# Patient Record
Sex: MALE | Race: WHITE | NOT HISPANIC OR LATINO | Employment: FULL TIME | URBAN - METROPOLITAN AREA
[De-identification: names, ages, dates, MRNs, and addresses within clinical notes are randomized per-mention and may not be internally consistent; named-entity substitution may affect disease eponyms.]

---

## 2023-09-26 PROBLEM — I10 ACCELERATED ESSENTIAL HYPERTENSION: Status: ACTIVE | Noted: 2023-09-26

## 2023-09-26 PROBLEM — D48.5 NEOPLASM OF UNCERTAIN BEHAVIOR OF SKIN: Status: ACTIVE | Noted: 2023-09-26

## 2023-09-26 PROBLEM — L85.3 XEROSIS CUTIS: Status: ACTIVE | Noted: 2023-03-21

## 2023-09-26 PROBLEM — D22.60 MELANOCYTIC NEVI OF UNSPECIFIED UPPER LIMB, INCLUDING SHOULDER: Status: ACTIVE | Noted: 2023-03-21

## 2023-09-26 PROBLEM — L81.4 OTHER MELANIN HYPERPIGMENTATION: Status: ACTIVE | Noted: 2023-03-21

## 2023-09-26 PROBLEM — L28.0 LICHEN SIMPLEX CHRONICUS: Status: ACTIVE | Noted: 2023-03-21

## 2023-09-26 PROBLEM — L91.8 OTHER HYPERTROPHIC DISORDERS OF THE SKIN: Status: ACTIVE | Noted: 2023-03-21

## 2023-09-26 PROBLEM — D22.5 MELANOCYTIC NEVI OF TRUNK: Status: ACTIVE | Noted: 2023-03-21

## 2023-09-26 PROBLEM — D18.01 HEMANGIOMA OF SKIN AND SUBCUTANEOUS TISSUE: Status: ACTIVE | Noted: 2023-03-21

## 2023-09-26 PROBLEM — L82.1 OTHER SEBORRHEIC KERATOSIS: Status: ACTIVE | Noted: 2023-03-21

## 2023-09-26 PROBLEM — E78.2 MIXED HYPERLIPIDEMIA: Status: ACTIVE | Noted: 2023-09-26

## 2023-09-26 PROBLEM — L82.0 INFLAMED SEBORRHEIC KERATOSIS: Status: ACTIVE | Noted: 2023-03-21

## 2023-09-26 PROBLEM — L57.0 ACTINIC KERATOSIS: Status: ACTIVE | Noted: 2023-03-21

## 2023-09-26 PROBLEM — D22.70 MELANOCYTIC NEVI OF UNSPECIFIED LOWER LIMB, INCLUDING HIP: Status: ACTIVE | Noted: 2023-03-21

## 2023-09-26 RX ORDER — NAPROXEN SODIUM 220 MG/1
81 TABLET, FILM COATED ORAL DAILY
COMMUNITY
Start: 2019-08-13

## 2023-09-26 RX ORDER — FLUOROURACIL 50 MG/G
CREAM TOPICAL
COMMUNITY
Start: 2019-10-22 | End: 2023-09-27 | Stop reason: ALTCHOICE

## 2023-09-26 RX ORDER — AMMONIUM LACTATE 12 G/100G
CREAM TOPICAL
COMMUNITY
Start: 2019-10-22

## 2023-09-27 ENCOUNTER — OFFICE VISIT (OUTPATIENT)
Dept: PRIMARY CARE | Facility: CLINIC | Age: 78
End: 2023-09-27
Payer: MEDICARE

## 2023-09-27 VITALS — OXYGEN SATURATION: 94 % | SYSTOLIC BLOOD PRESSURE: 138 MMHG | HEART RATE: 56 BPM | DIASTOLIC BLOOD PRESSURE: 80 MMHG

## 2023-09-27 DIAGNOSIS — Z00.00 ROUTINE GENERAL MEDICAL EXAMINATION AT HEALTH CARE FACILITY: Primary | ICD-10-CM

## 2023-09-27 DIAGNOSIS — E78.2 MIXED HYPERLIPIDEMIA: ICD-10-CM

## 2023-09-27 DIAGNOSIS — I10 ESSENTIAL (PRIMARY) HYPERTENSION: ICD-10-CM

## 2023-09-27 DIAGNOSIS — Z12.5 PROSTATE CANCER SCREENING: ICD-10-CM

## 2023-09-27 PROCEDURE — G0439 PPPS, SUBSEQ VISIT: HCPCS | Performed by: FAMILY MEDICINE

## 2023-09-27 PROCEDURE — 99213 OFFICE O/P EST LOW 20 MIN: CPT | Performed by: FAMILY MEDICINE

## 2023-09-27 PROCEDURE — 1170F FXNL STATUS ASSESSED: CPT | Performed by: FAMILY MEDICINE

## 2023-09-27 PROCEDURE — 1160F RVW MEDS BY RX/DR IN RCRD: CPT | Performed by: FAMILY MEDICINE

## 2023-09-27 PROCEDURE — 1159F MED LIST DOCD IN RCRD: CPT | Performed by: FAMILY MEDICINE

## 2023-09-27 PROCEDURE — 1036F TOBACCO NON-USER: CPT | Performed by: FAMILY MEDICINE

## 2023-09-27 PROCEDURE — 3075F SYST BP GE 130 - 139MM HG: CPT | Performed by: FAMILY MEDICINE

## 2023-09-27 PROCEDURE — 3079F DIAST BP 80-89 MM HG: CPT | Performed by: FAMILY MEDICINE

## 2023-09-27 ASSESSMENT — PATIENT HEALTH QUESTIONNAIRE - PHQ9
SUM OF ALL RESPONSES TO PHQ9 QUESTIONS 1 AND 2: 0
1. LITTLE INTEREST OR PLEASURE IN DOING THINGS: NOT AT ALL
2. FEELING DOWN, DEPRESSED OR HOPELESS: NOT AT ALL

## 2023-09-27 ASSESSMENT — ACTIVITIES OF DAILY LIVING (ADL)
DOING_HOUSEWORK: INDEPENDENT
DRESSING: INDEPENDENT
BATHING: INDEPENDENT
GROCERY_SHOPPING: INDEPENDENT
TAKING_MEDICATION: INDEPENDENT
MANAGING_FINANCES: INDEPENDENT

## 2023-09-27 ASSESSMENT — ENCOUNTER SYMPTOMS
CONSTIPATION: 0
PALPITATIONS: 0
VOMITING: 0
UNEXPECTED WEIGHT CHANGE: 0

## 2023-09-27 NOTE — PROGRESS NOTES
Wondering if he should still have the shingles vaccine at his age  Declines the flu shot  Subjective   Patient ID: Lalit Bailey is a 78 y.o. male who presents for Medicare Annual Wellness Visit Subsequent.    HPI   No CP SOB edema  Trying to follow good diet and staying active      Review of Systems   Constitutional:  Negative for unexpected weight change.   HENT:  Negative for congestion and ear discharge.    Cardiovascular:  Negative for chest pain and palpitations.   Gastrointestinal:  Negative for constipation and vomiting.   All other systems reviewed and are negative.      Objective   /80   Pulse 56   SpO2 94%     Physical Exam  HENT:      Head: Normocephalic and atraumatic.      Nose: Nose normal.      Mouth/Throat:      Mouth: Mucous membranes are moist.      Pharynx: No oropharyngeal exudate.   Eyes:      Extraocular Movements: Extraocular movements intact.      Conjunctiva/sclera: Conjunctivae normal.      Pupils: Pupils are equal, round, and reactive to light.   Cardiovascular:      Rate and Rhythm: Normal rate and regular rhythm.   Pulmonary:      Effort: Pulmonary effort is normal.   Abdominal:      General: There is no distension.      Palpations: Abdomen is soft.   Musculoskeletal:      Cervical back: Normal range of motion and neck supple.   Lymphadenopathy:      Cervical: No cervical adenopathy.   Neurological:      General: No focal deficit present.      Mental Status: He is alert.   Psychiatric:         Attention and Perception: Attention normal.         Speech: Speech normal.         Behavior: Behavior is cooperative.         Assessment/Plan   Diagnoses and all orders for this visit:  Routine general medical examination at health care facility  Prostate cancer screening  -     Prostate Specific Antigen, Screen; Future  Mixed hyperlipidemia  Comments:  Low-fat low cholesterol diet and regular activity  Patient does not want to be on a statin  Orders:  -     Lipid Panel; Future  -      CBC and Auto Differential; Future  -     Comprehensive Metabolic Panel; Future  Essential (primary) hypertension  Comments:  Controlled with lifestyle modification  Orders:  -     CBC and Auto Differential; Future  -     Comprehensive Metabolic Panel; Future    Offered CT cardiac scoring and patient defers  FRANCISCO NOVA discussed  Recheck 1 year sooner if any issues arise

## 2023-09-29 ENCOUNTER — LAB (OUTPATIENT)
Dept: LAB | Facility: LAB | Age: 78
End: 2023-09-29
Payer: MEDICARE

## 2023-09-29 DIAGNOSIS — I10 ESSENTIAL (PRIMARY) HYPERTENSION: ICD-10-CM

## 2023-09-29 DIAGNOSIS — E78.2 MIXED HYPERLIPIDEMIA: ICD-10-CM

## 2023-09-29 DIAGNOSIS — Z12.5 PROSTATE CANCER SCREENING: ICD-10-CM

## 2023-09-29 LAB
ALANINE AMINOTRANSFERASE (SGPT) (U/L) IN SER/PLAS: 18 U/L (ref 10–52)
ALBUMIN (G/DL) IN SER/PLAS: 3.7 G/DL (ref 3.4–5)
ALKALINE PHOSPHATASE (U/L) IN SER/PLAS: 63 U/L (ref 33–136)
ANION GAP IN SER/PLAS: 10 MMOL/L (ref 10–20)
ASPARTATE AMINOTRANSFERASE (SGOT) (U/L) IN SER/PLAS: 16 U/L (ref 9–39)
BASOPHILS (10*3/UL) IN BLOOD BY AUTOMATED COUNT: 0.07 X10E9/L (ref 0–0.1)
BASOPHILS/100 LEUKOCYTES IN BLOOD BY AUTOMATED COUNT: 1.5 % (ref 0–2)
BILIRUBIN TOTAL (MG/DL) IN SER/PLAS: 0.5 MG/DL (ref 0–1.2)
CALCIUM (MG/DL) IN SER/PLAS: 8.3 MG/DL (ref 8.6–10.3)
CARBON DIOXIDE, TOTAL (MMOL/L) IN SER/PLAS: 29 MMOL/L (ref 21–32)
CHLORIDE (MMOL/L) IN SER/PLAS: 105 MMOL/L (ref 98–107)
CHOLESTEROL (MG/DL) IN SER/PLAS: 233 MG/DL (ref 0–199)
CHOLESTEROL IN HDL (MG/DL) IN SER/PLAS: 47.4 MG/DL
CHOLESTEROL/HDL RATIO: 4.9
CREATININE (MG/DL) IN SER/PLAS: 0.93 MG/DL (ref 0.5–1.3)
EOSINOPHILS (10*3/UL) IN BLOOD BY AUTOMATED COUNT: 0.43 X10E9/L (ref 0–0.4)
EOSINOPHILS/100 LEUKOCYTES IN BLOOD BY AUTOMATED COUNT: 9.5 % (ref 0–6)
ERYTHROCYTE DISTRIBUTION WIDTH (RATIO) BY AUTOMATED COUNT: 13 % (ref 11.5–14.5)
ERYTHROCYTE MEAN CORPUSCULAR HEMOGLOBIN CONCENTRATION (G/DL) BY AUTOMATED: 31.2 G/DL (ref 32–36)
ERYTHROCYTE MEAN CORPUSCULAR VOLUME (FL) BY AUTOMATED COUNT: 95 FL (ref 80–100)
ERYTHROCYTES (10*6/UL) IN BLOOD BY AUTOMATED COUNT: 4.75 X10E12/L (ref 4.5–5.9)
GFR MALE: 85 ML/MIN/1.73M2
GLUCOSE (MG/DL) IN SER/PLAS: 107 MG/DL (ref 74–99)
HEMATOCRIT (%) IN BLOOD BY AUTOMATED COUNT: 44.9 % (ref 41–52)
HEMOGLOBIN (G/DL) IN BLOOD: 14 G/DL (ref 13.5–17.5)
IMMATURE GRANULOCYTES/100 LEUKOCYTES IN BLOOD BY AUTOMATED COUNT: 0.7 % (ref 0–0.9)
LDL: 138 MG/DL (ref 0–99)
LEUKOCYTES (10*3/UL) IN BLOOD BY AUTOMATED COUNT: 4.5 X10E9/L (ref 4.4–11.3)
LYMPHOCYTES (10*3/UL) IN BLOOD BY AUTOMATED COUNT: 1.07 X10E9/L (ref 0.8–3)
LYMPHOCYTES/100 LEUKOCYTES IN BLOOD BY AUTOMATED COUNT: 23.6 % (ref 13–44)
MONOCYTES (10*3/UL) IN BLOOD BY AUTOMATED COUNT: 0.51 X10E9/L (ref 0.05–0.8)
MONOCYTES/100 LEUKOCYTES IN BLOOD BY AUTOMATED COUNT: 11.3 % (ref 2–10)
NEUTROPHILS (10*3/UL) IN BLOOD BY AUTOMATED COUNT: 2.42 X10E9/L (ref 1.6–5.5)
NEUTROPHILS/100 LEUKOCYTES IN BLOOD BY AUTOMATED COUNT: 53.4 % (ref 40–80)
NON HDL CHOLESTEROL: 186 MG/DL
PLATELETS (10*3/UL) IN BLOOD AUTOMATED COUNT: 229 X10E9/L (ref 150–450)
POTASSIUM (MMOL/L) IN SER/PLAS: 4.4 MMOL/L (ref 3.5–5.3)
PROSTATE SPECIFIC ANTIGEN,SCREEN: 2.48 NG/ML (ref 0–4)
PROTEIN TOTAL: 6.1 G/DL (ref 6.4–8.2)
SODIUM (MMOL/L) IN SER/PLAS: 140 MMOL/L (ref 136–145)
TRIGLYCERIDE (MG/DL) IN SER/PLAS: 236 MG/DL (ref 0–149)
UREA NITROGEN (MG/DL) IN SER/PLAS: 13 MG/DL (ref 6–23)
VLDL: 47 MG/DL (ref 0–40)

## 2023-09-29 PROCEDURE — 85025 COMPLETE CBC W/AUTO DIFF WBC: CPT

## 2023-09-29 PROCEDURE — G0103 PSA SCREENING: HCPCS

## 2023-09-29 PROCEDURE — 80053 COMPREHEN METABOLIC PANEL: CPT

## 2023-09-29 PROCEDURE — 80061 LIPID PANEL: CPT

## 2023-09-29 PROCEDURE — 36415 COLL VENOUS BLD VENIPUNCTURE: CPT

## 2024-01-23 ENCOUNTER — APPOINTMENT (OUTPATIENT)
Dept: DERMATOLOGY | Facility: CLINIC | Age: 79
End: 2024-01-23
Payer: MEDICARE

## 2024-01-24 ENCOUNTER — OFFICE VISIT (OUTPATIENT)
Dept: DERMATOLOGY | Facility: CLINIC | Age: 79
End: 2024-01-24
Payer: MEDICARE

## 2024-01-24 DIAGNOSIS — L82.1 SEBORRHEIC KERATOSIS: ICD-10-CM

## 2024-01-24 DIAGNOSIS — L57.0 ACTINIC KERATOSIS: Primary | ICD-10-CM

## 2024-01-24 DIAGNOSIS — D22.9 BENIGN NEVUS: ICD-10-CM

## 2024-01-24 DIAGNOSIS — L57.9 SKIN CHANGES DUE TO CHRONIC EXPOSURE TO NONIONIZING RADIATION: ICD-10-CM

## 2024-01-24 DIAGNOSIS — L81.4 LENTIGO: ICD-10-CM

## 2024-01-24 DIAGNOSIS — D18.01 ANGIOMA OF SKIN: ICD-10-CM

## 2024-01-24 PROCEDURE — 17000 DESTRUCT PREMALG LESION: CPT | Performed by: NURSE PRACTITIONER

## 2024-01-24 PROCEDURE — 1159F MED LIST DOCD IN RCRD: CPT | Performed by: NURSE PRACTITIONER

## 2024-01-24 PROCEDURE — 99213 OFFICE O/P EST LOW 20 MIN: CPT | Performed by: NURSE PRACTITIONER

## 2024-01-24 PROCEDURE — 1036F TOBACCO NON-USER: CPT | Performed by: NURSE PRACTITIONER

## 2024-01-24 PROCEDURE — 17003 DESTRUCT PREMALG LES 2-14: CPT | Performed by: NURSE PRACTITIONER

## 2024-01-24 PROCEDURE — 1160F RVW MEDS BY RX/DR IN RCRD: CPT | Performed by: NURSE PRACTITIONER

## 2024-01-24 NOTE — PROGRESS NOTES
Subjective     Lalit Bailey is a 78 y.o. male who presents for the following: Skin Check.     Review of Systems:  No other skin or systemic complaints other than what is documented elsewhere in the note.    The following portions of the chart were reviewed this encounter and updated as appropriate:   Tobacco  Allergies  Meds  Problems  Med Hx  Surg Hx         Skin Cancer History  No skin cancer on file.      Specialty Problems          Dermatology Problems    Actinic keratosis    Hemangioma of skin and subcutaneous tissue    Inflamed seborrheic keratosis    Lichen simplex chronicus    Melanocytic nevi of trunk    Melanocytic nevi of unspecified lower limb, including hip    Melanocytic nevi of unspecified upper limb, including shoulder    Other hypertrophic disorders of the skin    Other melanin hyperpigmentation    Other seborrheic keratosis    Xerosis cutis    Neoplasm of uncertain behavior of skin        Objective   Well appearing patient in no apparent distress; mood and affect are within normal limits.    A focused skin examination was performed waist up. All findings within normal limits unless otherwise noted below.    Assessment/Plan   1. Actinic keratosis (13)  Dorsum of Nose, Left Ala Nasi, Left Forehead, Left Parotid Area, Mid Parietal Scalp (7), Right Forehead (2)  Thin erythematous papules with gritty scale    WHAT IS ACTINIC KERATOSIS?   - Actinic keratosis (AK) is a skin condition caused by sun damage. It causes scaly, rough, or bumpy spots on the skin.  - If left alone, AKs may turn into a skin cancer. People who burn easily or have trouble tanning are at more risk for developing AKs.   - There is no one test for AKs and diagnosis is made by clinical appearance. Treatment options include cryotherapy, therapy with lights, and various creams (e.g., topical 5-fluorocuracil, imiquimod).       To lower the chance of getting AK, you can:       ?  Stay out of the sun in the middle of the day  (from 10 a.m. to 4 p.m.)       ?  Wear sunscreen - An SPF of at least 30 is best. The SPF number is on the sunscreen bottle or tube.       ?  Wear a wide-brimmed hat, long-sleeved shirt, long pants, or long skirt outside. A baseball hat does not give much protection.        ?  Do not use tanning beds.        ?  Keep a low-fat diet, less than 21% of calories should come from fat       ?  Take Vitamin B3 (nicotinomide) 500mg twice daily.      YOUR TREATMENT PLAN  - At this time I recommend treatment with cryotherapy.  - Possible side effects of liquid nitrogen treatment reviewed including formation of blisters, crusting, tenderness, scar, and discoloration which may be permanent.  - Patient advised to return the office for re-evaluation if the treated lesion(s) do not resolve within 4-6 weeks. Patient verbalizes understanding.    Destr of lesion - Dorsum of Nose, Left Ala Nasi, Left Forehead, Left Parotid Area, Mid Parietal Scalp (7), Right Forehead (2)  Complexity: simple    Destruction method: cryotherapy    Informed consent: discussed and consent obtained    Timeout:  patient name, date of birth, surgical site, and procedure verified  Lesion destroyed using liquid nitrogen: Yes    Cryotherapy cycles:  2  Outcome: patient tolerated procedure well with no complications    Post-procedure details: wound care instructions given      2. Angioma of skin  Scattered cherry-red papule(s).    A cherry hemangioma is a small macule (small, flat, smooth area) or papule (small, solid bump) formed from an overgrowth of tiny blood vessels in the skin. Cherry hemangiomas are characteristically red or purplish in color. They often first appear in middle adulthood and usually increase in number with age. Cherry hemangiomas are noncancerous (benign) and are common in adults.    The present appearance of the lesion is not worrisome but it should continue to be observed and testing/treatment may be warranted if change occurs.    3.  Benign nevus  Scattered, uniform and benign-appearing, regular brown melanocytic papules and macules.    The present appearance of the lesion is not worrisome but it should continue to be observed and testing/treatment may be warranted if change occurs.    4. Seborrheic keratosis  Stuck on verrucous, tan-brown papules and plaques.      Seborrheic keratoses are common noncancerous (benign) growths of unknown cause seen in adults due to a thickening of an area of the top skin layer. Seborrheic keratoses may appear as if they are stuck on to the skin. They have distinct borders, and they may appear as papules (small, solid bumps) or plaques (solid, raised patches that are bigger than a thumbnail). They may be the same color as your skin, or they may be pink, light brown, darker brown, or very dark brown, or sometimes may appear black.    There is no way to prevent new seborrheic keratoses from forming. Seborrheic keratoses can be removed, but removal is considered a cosmetic issue and is usually not covered by insurance.    PLAN  No treatment is needed unless there is irritation from clothing, such as itching or bleeding.  2.   Some lotions containing alpha hydroxy acids, salicylic acid, or urea may make the areas feel smoother with regular use but will not eliminate them.    5. Lentigo  Scattered tan macules in sun-exposed areas.    A solar lentigo (plural, solar lentigines), also known as a sun-induced freckle or senile lentigo, is a dark (hyperpigmented) lesion caused by natural or artificial ultraviolet (UV) light. Solar lentigines may be single or multiple. This type of lentigo is different from a simple lentigo (lentigo simplex) because it is caused by exposure to UV light. Solar lentigines are benign, but they do indicate excessive sun exposure, a risk factor for the development of skin cancer.    To prevent solar lentigines, avoid exposure to sunlight in midday (10 AM to 3 PM), wear sun-protective clothing  (tightly woven clothes and hats), and apply sunscreen (SPF 30 UVA and UVB block).    The present appearance of the lesion is not worrisome but it should continue to be observed and testing/treatment may be warranted if change occurs.    6. Skin changes due to chronic exposure to nonionizing radiation  Actinic changes in the form of freckles, lentigines and hyper/hypopigmentation     ABCDEs of melanoma and atypical moles were discussed with the patient.    Patient was instructed to perform monthly self skin examination.  We recommended that the patient have regular full skin exams given an increased risk of subsequent skin cancers.    The patient was instructed to use sun protective behaviors including use of broad spectrum sunscreens and sun protective clothing to reduce risk of skin cancers.    Warning signs of non-melanoma skin cancer discussed.        Return to clinic in 1 year for skin check/follow up or sooner if needed

## 2024-01-24 NOTE — PATIENT INSTRUCTIONS

## 2024-11-22 ENCOUNTER — TELEPHONE (OUTPATIENT)
Dept: DERMATOLOGY | Facility: CLINIC | Age: 79
End: 2024-11-22
Payer: MEDICARE

## 2024-11-22 DIAGNOSIS — L85.3 XEROSIS OF SKIN: Primary | ICD-10-CM

## 2024-11-22 RX ORDER — AMMONIUM LACTATE 12 G/100G
1 CREAM TOPICAL DAILY
Qty: 385 G | Refills: 11 | Status: SHIPPED | OUTPATIENT
Start: 2024-11-22

## 2024-11-22 NOTE — TELEPHONE ENCOUNTER
Patient needs a refill on ammonium lactate 12% cream.  He has an appt late January but needs a refill until then.

## 2025-01-27 ENCOUNTER — APPOINTMENT (OUTPATIENT)
Dept: DERMATOLOGY | Facility: CLINIC | Age: 80
End: 2025-01-27
Payer: MEDICARE

## 2025-01-27 DIAGNOSIS — D48.5 NEOPLASM OF UNCERTAIN BEHAVIOR OF SKIN: Primary | ICD-10-CM

## 2025-01-27 DIAGNOSIS — D18.01 ANGIOMA OF SKIN: ICD-10-CM

## 2025-01-27 DIAGNOSIS — L82.1 SEBORRHEIC KERATOSIS: ICD-10-CM

## 2025-01-27 DIAGNOSIS — L81.4 LENTIGO: ICD-10-CM

## 2025-01-27 DIAGNOSIS — L57.0 ACTINIC KERATOSIS: ICD-10-CM

## 2025-01-27 DIAGNOSIS — L57.9 SKIN CHANGES DUE TO CHRONIC EXPOSURE TO NONIONIZING RADIATION: ICD-10-CM

## 2025-01-27 DIAGNOSIS — D22.9 BENIGN NEVUS: ICD-10-CM

## 2025-01-27 PROCEDURE — 99213 OFFICE O/P EST LOW 20 MIN: CPT | Performed by: NURSE PRACTITIONER

## 2025-01-27 PROCEDURE — 11102 TANGNTL BX SKIN SINGLE LES: CPT | Performed by: NURSE PRACTITIONER

## 2025-01-27 PROCEDURE — 1036F TOBACCO NON-USER: CPT | Performed by: NURSE PRACTITIONER

## 2025-01-27 PROCEDURE — 1160F RVW MEDS BY RX/DR IN RCRD: CPT | Performed by: NURSE PRACTITIONER

## 2025-01-27 PROCEDURE — 1159F MED LIST DOCD IN RCRD: CPT | Performed by: NURSE PRACTITIONER

## 2025-01-27 RX ORDER — FLUOROURACIL 50 MG/G
CREAM TOPICAL
Qty: 40 G | Refills: 0 | Status: SHIPPED | OUTPATIENT
Start: 2025-01-27

## 2025-01-27 NOTE — PATIENT INSTRUCTIONS

## 2025-01-27 NOTE — PROGRESS NOTES
Subjective     Lalit Bailey is a 79 y.o. male who presents for the following: Skin Check.     Review of Systems:  No other skin or systemic complaints other than what is documented elsewhere in the note.    The following portions of the chart were reviewed this encounter and updated as appropriate:   Tobacco  Allergies  Meds  Problems  Med Hx  Surg Hx         Skin Cancer History  No skin cancer on file.      Specialty Problems          Dermatology Problems    Actinic keratosis    Hemangioma of skin and subcutaneous tissue    Inflamed seborrheic keratosis    Lichen simplex chronicus    Melanocytic nevi of trunk    Melanocytic nevi of unspecified lower limb, including hip    Melanocytic nevi of unspecified upper limb, including shoulder    Other hypertrophic disorders of the skin    Other melanin hyperpigmentation    Other seborrheic keratosis    Xerosis cutis    Neoplasm of uncertain behavior of skin        Objective   Well appearing patient in no apparent distress; mood and affect are within normal limits.    A focused skin examination was performed waist up. All findings within normal limits unless otherwise noted below.    Assessment/Plan   1. Neoplasm of uncertain behavior of skin  Left Shoulder - Anterior  10 mm red scaly macule with crusted ulcer              Lesion biopsy  Type of biopsy: tangential    Informed consent: discussed and consent obtained    Timeout: patient name, date of birth, surgical site, and procedure verified    Procedure prep:  Patient was prepped and draped  Anesthesia: the lesion was anesthetized in a standard fashion    Anesthetic:  1% lidocaine plain local infiltration  Instrument used: DermaBlade    Hemostasis achieved with: electrodesiccation    Outcome: patient tolerated procedure well    Post-procedure details: wound care instructions given    Additional details:  Cleaned area with isopropyl alcohol prior to anesthesia or biopsy. Applied thin layer of vaseline and  covered with bandaid after procedure      Staff Communication: Dermatology Local Anesthesia: 1 % Plain Lidocaine - Amount: 0. 5ml    Specimen 1 - Dermatopathology- DERM LAB  Differential Diagnosis: NMSC  Check Margins Yes/No?:    Comments:    Dermpath Lab: Routine Histopathology (formalin-fixed tissue)    NUB  - Given uncertainty in clinical diagnosis, shave biopsy is recommended in clinic today.  - The patient expressed understanding, is in agreement with this plan, and wishes to proceed with biopsy.  - Oral and written wound care instructions provided.  - Advised patient that the office will call within 2 weeks to discuss biopsy results.      Related Procedures  Follow Up In Dermatology - Established Patient    2. Actinic keratosis  Scalp, Forehead  Thin erythematous papules with gritty scale    WHAT IS ACTINIC KERATOSIS?   - Actinic keratosis (AK) is a skin condition caused by sun damage. It causes scaly, rough, or bumpy spots on the skin.  - If left alone, AKs may turn into a skin cancer. People who burn easily or have trouble tanning are at more risk for developing AKs.   - There is no one test for AKs and diagnosis is made by clinical appearance. Treatment options include cryotherapy, therapy with lights, and various creams (e.g., topical 5-fluorocuracil, imiquimod).       To lower the chance of getting AK, you can:       ?  Stay out of the sun in the middle of the day (from 10 a.m. to 4 p.m.)       ?  Wear sunscreen - An SPF of at least 30 is best. The SPF number is on the sunscreen bottle or tube.       ?  Wear a wide-brimmed hat, long-sleeved shirt, long pants, or long skirt outside. A baseball hat does not give much protection.        ?  Do not use tanning beds.        ?  Keep a low-fat diet, less than 21% of calories should come from fat       ?  Take Vitamin B3 (nicotinomide) 500mg twice daily.      YOUR TREATMENT PLAN  - At this time I recommend treatment with topical 5-Fluorouracil cream BID x4 weeks    - Provided application handout on topical 5-Fluorouracil cream and reviewed in detail.  - The patient expressed understanding, is in agreement with this plan, and wishes to proceed.    Related Procedures  Follow Up In Dermatology - Established Patient  Follow Up In Dermatology - Established Patient    Related Medications  fluorouracil (Efudex) 5 % cream cream  Apply to affected areas as directed (following handout provided) twice daily for 2 weeks.    3. Angioma of skin  Scattered cherry-red papule(s).    A cherry hemangioma is a small macule (small, flat, smooth area) or papule (small, solid bump) formed from an overgrowth of tiny blood vessels in the skin. Cherry hemangiomas are characteristically red or purplish in color. They often first appear in middle adulthood and usually increase in number with age. Cherry hemangiomas are noncancerous (benign) and are common in adults.    The present appearance of the lesion is not worrisome but it should continue to be observed and testing/treatment may be warranted if change occurs.    Related Procedures  Follow Up In Dermatology - Established Patient  Follow Up In Dermatology - Established Patient    4. Benign nevus  Scattered, uniform and benign-appearing, regular brown melanocytic papules and macules.    The present appearance of the lesion is not worrisome but it should continue to be observed and testing/treatment may be warranted if change occurs.    Related Procedures  Follow Up In Dermatology - Established Patient  Follow Up In Dermatology - Established Patient    5. Seborrheic keratosis  Stuck on verrucous, tan-brown papules and plaques.      Seborrheic keratoses are common noncancerous (benign) growths of unknown cause seen in adults due to a thickening of an area of the top skin layer. Seborrheic keratoses may appear as if they are stuck on to the skin. They have distinct borders, and they may appear as papules (small, solid bumps) or plaques (solid, raised  patches that are bigger than a thumbnail). They may be the same color as your skin, or they may be pink, light brown, darker brown, or very dark brown, or sometimes may appear black.    There is no way to prevent new seborrheic keratoses from forming. Seborrheic keratoses can be removed, but removal is considered a cosmetic issue and is usually not covered by insurance.    PLAN  No treatment is needed unless there is irritation from clothing, such as itching or bleeding.  2.   Some lotions containing alpha hydroxy acids, salicylic acid, or urea may make the areas feel smoother with regular use but will not eliminate them.    Related Procedures  Follow Up In Dermatology - Established Patient  Follow Up In Dermatology - Established Patient    6. Lentigo  Scattered tan macules in sun-exposed areas.    A solar lentigo (plural, solar lentigines), also known as a sun-induced freckle or senile lentigo, is a dark (hyperpigmented) lesion caused by natural or artificial ultraviolet (UV) light. Solar lentigines may be single or multiple. This type of lentigo is different from a simple lentigo (lentigo simplex) because it is caused by exposure to UV light. Solar lentigines are benign, but they do indicate excessive sun exposure, a risk factor for the development of skin cancer.    To prevent solar lentigines, avoid exposure to sunlight in midday (10 AM to 3 PM), wear sun-protective clothing (tightly woven clothes and hats), and apply sunscreen (SPF 30 UVA and UVB block).    The present appearance of the lesion is not worrisome but it should continue to be observed and testing/treatment may be warranted if change occurs.    Related Procedures  Follow Up In Dermatology - Established Patient  Follow Up In Dermatology - Established Patient    7. Skin changes due to chronic exposure to nonionizing radiation  Actinic changes in the form of freckles, lentigines and hyper/hypopigmentation     ABCDEs of melanoma and atypical moles were  discussed with the patient.    Patient was instructed to perform monthly self skin examination.  We recommended that the patient have regular full skin exams given an increased risk of subsequent skin cancers.    The patient was instructed to use sun protective behaviors including use of broad spectrum sunscreens and sun protective clothing to reduce risk of skin cancers.    Warning signs of non-melanoma skin cancer discussed.    Related Procedures  Follow Up In Dermatology - Established Patient  Follow Up In Dermatology - Established Patient        Return in 2 months s/p efudex or return to clinic sooner if needed

## 2025-01-29 LAB
LABORATORY COMMENT REPORT: NORMAL
PATH REPORT.FINAL DX SPEC: NORMAL
PATH REPORT.GROSS SPEC: NORMAL
PATH REPORT.RELEVANT HX SPEC: NORMAL
PATH REPORT.TOTAL CANCER: NORMAL

## 2025-01-30 ENCOUNTER — TELEPHONE (OUTPATIENT)
Dept: DERMATOLOGY | Facility: CLINIC | Age: 80
End: 2025-01-30
Payer: MEDICARE

## 2025-03-31 ENCOUNTER — APPOINTMENT (OUTPATIENT)
Dept: DERMATOLOGY | Facility: CLINIC | Age: 80
End: 2025-03-31
Payer: MEDICARE

## 2025-03-31 DIAGNOSIS — L82.1 SEBORRHEIC KERATOSIS: ICD-10-CM

## 2025-03-31 DIAGNOSIS — L57.0 ACTINIC KERATOSIS: ICD-10-CM

## 2025-03-31 PROCEDURE — 99213 OFFICE O/P EST LOW 20 MIN: CPT | Performed by: NURSE PRACTITIONER

## 2025-03-31 PROCEDURE — 1160F RVW MEDS BY RX/DR IN RCRD: CPT | Performed by: NURSE PRACTITIONER

## 2025-03-31 PROCEDURE — 1159F MED LIST DOCD IN RCRD: CPT | Performed by: NURSE PRACTITIONER

## 2025-03-31 PROCEDURE — 1036F TOBACCO NON-USER: CPT | Performed by: NURSE PRACTITIONER

## 2025-03-31 NOTE — PATIENT INSTRUCTIONS

## 2025-03-31 NOTE — PROGRESS NOTES
Subjective     Lalit Bailey is a 79 y.o. male who presents for the following: Suspicious Skin Lesion (Efudex follow up).     Review of Systems:  No other skin or systemic complaints other than what is documented elsewhere in the note.    The following portions of the chart were reviewed this encounter and updated as appropriate:   Tobacco  Allergies  Meds  Problems  Med Hx  Surg Hx         Skin Cancer History  No skin cancer on file.      Specialty Problems          Dermatology Problems    Actinic keratosis    Hemangioma of skin and subcutaneous tissue    Inflamed seborrheic keratosis    Lichen simplex chronicus    Melanocytic nevi of trunk    Melanocytic nevi of unspecified lower limb, including hip    Melanocytic nevi of unspecified upper limb, including shoulder    Other hypertrophic disorders of the skin    Other melanin hyperpigmentation    Other seborrheic keratosis    Xerosis cutis    Neoplasm of uncertain behavior of skin        Objective   Well appearing patient in no apparent distress; mood and affect are within normal limits.    A focused skin examination was performed. All findings within normal limits unless otherwise noted below.    Assessment/Plan   1. Actinic keratosis  Scalp, Forehead  No erythematous papules with gritty scale    Clear following treatment with efudex. Reinforced continued use of sun protection.     Related Procedures  Follow Up In Dermatology - Established Patient  Follow Up In Dermatology - Established Patient    Related Medications  fluorouracil (Efudex) 5 % cream cream  Apply to affected areas as directed (following handout provided) twice daily for 2 weeks.    2. Seborrheic keratosis  Right Wrist - Anterior  Stuck on verrucous, tan-brown papules and plaques.      Seborrheic keratoses are common noncancerous (benign) growths of unknown cause seen in adults due to a thickening of an area of the top skin layer. Seborrheic keratoses may appear as if they are stuck on  to the skin. They have distinct borders, and they may appear as papules (small, solid bumps) or plaques (solid, raised patches that are bigger than a thumbnail). They may be the same color as your skin, or they may be pink, light brown, darker brown, or very dark brown, or sometimes may appear black.    There is no way to prevent new seborrheic keratoses from forming. Seborrheic keratoses can be removed, but removal is considered a cosmetic issue and is usually not covered by insurance.    PLAN  No treatment is needed unless there is irritation from clothing, such as itching or bleeding.  2.   Some lotions containing alpha hydroxy acids, salicylic acid, or urea may make the areas feel smoother with regular use but will not eliminate them.    Related Procedures  Follow Up In Dermatology - Established Patient  Follow Up In Dermatology - Established Patient        Return in 1 year for routine skin check or return to clinic sooner if needed

## 2025-05-27 PROBLEM — I10 MALIGNANT ESSENTIAL HYPERTENSION: Status: ACTIVE | Noted: 2023-09-26

## 2025-05-27 PROBLEM — L85.3 ASTEATOSIS CUTIS: Status: ACTIVE | Noted: 2023-03-21

## 2025-05-29 ENCOUNTER — APPOINTMENT (OUTPATIENT)
Dept: PRIMARY CARE | Facility: CLINIC | Age: 80
End: 2025-05-29
Payer: MEDICARE

## 2025-05-29 VITALS
WEIGHT: 207 LBS | SYSTOLIC BLOOD PRESSURE: 132 MMHG | DIASTOLIC BLOOD PRESSURE: 78 MMHG | HEART RATE: 60 BPM | OXYGEN SATURATION: 98 % | TEMPERATURE: 97.1 F | BODY MASS INDEX: 28.07 KG/M2

## 2025-05-29 DIAGNOSIS — Z00.00 ROUTINE GENERAL MEDICAL EXAMINATION AT HEALTH CARE FACILITY: ICD-10-CM

## 2025-05-29 DIAGNOSIS — E78.2 MIXED HYPERLIPIDEMIA: ICD-10-CM

## 2025-05-29 DIAGNOSIS — E78.5 DYSLIPIDEMIA: ICD-10-CM

## 2025-05-29 DIAGNOSIS — R73.01 IMPAIRED FASTING GLUCOSE: ICD-10-CM

## 2025-05-29 DIAGNOSIS — Z12.5 PROSTATE CANCER SCREENING: ICD-10-CM

## 2025-05-29 DIAGNOSIS — I10 ESSENTIAL (PRIMARY) HYPERTENSION: Primary | ICD-10-CM

## 2025-05-29 LAB
POC APPEARANCE, URINE: CLEAR
POC BILIRUBIN, URINE: NEGATIVE
POC BLOOD, URINE: NEGATIVE
POC COLOR, URINE: YELLOW
POC GLUCOSE, URINE: NEGATIVE MG/DL
POC KETONES, URINE: NEGATIVE MG/DL
POC LEUKOCYTES, URINE: ABNORMAL
POC NITRITE,URINE: NEGATIVE
POC PH, URINE: 7.5 PH
POC PROTEIN, URINE: ABNORMAL MG/DL
POC SPECIFIC GRAVITY, URINE: 1.02
POC UROBILINOGEN, URINE: 0.2 EU/DL

## 2025-05-29 PROCEDURE — 1124F ACP DISCUSS-NO DSCNMKR DOCD: CPT | Performed by: FAMILY MEDICINE

## 2025-05-29 PROCEDURE — 3075F SYST BP GE 130 - 139MM HG: CPT | Performed by: FAMILY MEDICINE

## 2025-05-29 PROCEDURE — 1036F TOBACCO NON-USER: CPT | Performed by: FAMILY MEDICINE

## 2025-05-29 PROCEDURE — 99214 OFFICE O/P EST MOD 30 MIN: CPT | Performed by: FAMILY MEDICINE

## 2025-05-29 PROCEDURE — 1159F MED LIST DOCD IN RCRD: CPT | Performed by: FAMILY MEDICINE

## 2025-05-29 PROCEDURE — 3078F DIAST BP <80 MM HG: CPT | Performed by: FAMILY MEDICINE

## 2025-05-29 PROCEDURE — 1123F ACP DISCUSS/DSCN MKR DOCD: CPT | Performed by: FAMILY MEDICINE

## 2025-05-29 PROCEDURE — 1160F RVW MEDS BY RX/DR IN RCRD: CPT | Performed by: FAMILY MEDICINE

## 2025-05-29 PROCEDURE — 81003 URINALYSIS AUTO W/O SCOPE: CPT | Performed by: FAMILY MEDICINE

## 2025-05-29 PROCEDURE — 1170F FXNL STATUS ASSESSED: CPT | Performed by: FAMILY MEDICINE

## 2025-05-29 PROCEDURE — G0439 PPPS, SUBSEQ VISIT: HCPCS | Performed by: FAMILY MEDICINE

## 2025-05-29 ASSESSMENT — ENCOUNTER SYMPTOMS
CONSTIPATION: 0
LOSS OF SENSATION IN FEET: 0
DEPRESSION: 0
UNEXPECTED WEIGHT CHANGE: 0
VOMITING: 0
OCCASIONAL FEELINGS OF UNSTEADINESS: 0
PALPITATIONS: 0

## 2025-05-29 ASSESSMENT — PATIENT HEALTH QUESTIONNAIRE - PHQ9
2. FEELING DOWN, DEPRESSED OR HOPELESS: NOT AT ALL
1. LITTLE INTEREST OR PLEASURE IN DOING THINGS: NOT AT ALL
SUM OF ALL RESPONSES TO PHQ9 QUESTIONS 1 AND 2: 0

## 2025-05-29 ASSESSMENT — ACTIVITIES OF DAILY LIVING (ADL)
DRESSING: INDEPENDENT
GROCERY_SHOPPING: INDEPENDENT
MANAGING_FINANCES: INDEPENDENT
DOING_HOUSEWORK: INDEPENDENT
TAKING_MEDICATION: INDEPENDENT
BATHING: INDEPENDENT

## 2025-05-29 NOTE — PROGRESS NOTES
Subjective   Patient ID: Lalit Bailey is a 79 y.o. male who presents for Medicare Annual Wellness Visit Subsequent (Lalit is here today medicare annual wellness visit.).    HPI   Patient has hx of stable hypertension, hyperlipidemia.  Pt denies chest pain, shortness of breath and edema.  Patient's current treatment as listed in Rx.  Patient is compliant with treatment and complains of no side effects associated treatment.    Review of Systems   Constitutional:  Negative for unexpected weight change.   HENT:  Negative for congestion and ear discharge.    Cardiovascular:  Negative for chest pain and palpitations.   Gastrointestinal:  Negative for constipation and vomiting.   All other systems reviewed and are negative.      Objective   /78 (BP Location: Left arm, Patient Position: Sitting)   Pulse 60   Temp 36.2 °C (97.1 °F) (Temporal)   Wt 93.9 kg (207 lb)   SpO2 98%   BMI 28.07 kg/m²     Physical Exam  HENT:      Head: Normocephalic and atraumatic.      Nose: Nose normal.      Mouth/Throat:      Mouth: Mucous membranes are moist.      Pharynx: No oropharyngeal exudate.   Eyes:      Extraocular Movements: Extraocular movements intact.      Conjunctiva/sclera: Conjunctivae normal.      Pupils: Pupils are equal, round, and reactive to light.   Cardiovascular:      Rate and Rhythm: Normal rate and regular rhythm.   Pulmonary:      Effort: Pulmonary effort is normal.      Breath sounds: Normal breath sounds.   Abdominal:      General: There is no distension.      Palpations: Abdomen is soft.   Musculoskeletal:      Cervical back: Normal range of motion and neck supple.   Lymphadenopathy:      Cervical: No cervical adenopathy.   Neurological:      General: No focal deficit present.      Mental Status: He is alert.   Psychiatric:         Attention and Perception: Attention normal.         Speech: Speech normal.         Behavior: Behavior is cooperative.         Assessment/Plan   Problem List Items  Addressed This Visit           ICD-10-CM    Mixed hyperlipidemia E78.2     Other Visit Diagnoses         Codes      Essential (primary) hypertension    -  Primary I10    Relevant Orders    Hemoglobin A1C    Lipid Panel    CBC and Auto Differential    Comprehensive Metabolic Panel    POCT UA Automated manually resulted (Completed)      Dyslipidemia     E78.5    Relevant Orders    Hemoglobin A1C    Lipid Panel    CBC and Auto Differential    Comprehensive Metabolic Panel      Impaired fasting glucose     R73.01    Relevant Orders    Hemoglobin A1C    Lipid Panel    CBC and Auto Differential    Comprehensive Metabolic Panel      Prostate cancer screening     Z12.5    Relevant Orders    Prostate Specific Antigen, Screen      Routine general medical examination at health care facility     Z00.00    Relevant Orders    1 Year Follow Up In Primary Care - Wellness Exam        HM LM discussed  Medical conditions are stable  Recheck 6 to 12 months sooner if any issues arise        07-Mar-2021

## 2025-05-30 LAB
ALBUMIN SERPL-MCNC: 4.4 G/DL (ref 3.6–5.1)
ALP SERPL-CCNC: 81 U/L (ref 35–144)
ALT SERPL-CCNC: 19 U/L (ref 9–46)
ANION GAP SERPL CALCULATED.4IONS-SCNC: 7 MMOL/L (CALC) (ref 7–17)
AST SERPL-CCNC: 20 U/L (ref 10–35)
BASOPHILS # BLD AUTO: 72 CELLS/UL (ref 0–200)
BASOPHILS NFR BLD AUTO: 1.6 %
BILIRUB SERPL-MCNC: 0.4 MG/DL (ref 0.2–1.2)
BUN SERPL-MCNC: 15 MG/DL (ref 7–25)
CALCIUM SERPL-MCNC: 8.8 MG/DL (ref 8.6–10.3)
CHLORIDE SERPL-SCNC: 103 MMOL/L (ref 98–110)
CHOLEST SERPL-MCNC: 264 MG/DL
CHOLEST/HDLC SERPL: 4.9 (CALC)
CO2 SERPL-SCNC: 29 MMOL/L (ref 20–32)
CREAT SERPL-MCNC: 0.87 MG/DL (ref 0.7–1.28)
EGFRCR SERPLBLD CKD-EPI 2021: 89 ML/MIN/1.73M2
EOSINOPHIL # BLD AUTO: 270 CELLS/UL (ref 15–500)
EOSINOPHIL NFR BLD AUTO: 6 %
ERYTHROCYTE [DISTWIDTH] IN BLOOD BY AUTOMATED COUNT: 13.6 % (ref 11–15)
EST. AVERAGE GLUCOSE BLD GHB EST-MCNC: 126 MG/DL
EST. AVERAGE GLUCOSE BLD GHB EST-SCNC: 7 MMOL/L
GLUCOSE SERPL-MCNC: 112 MG/DL (ref 65–99)
HBA1C MFR BLD: 6 %
HCT VFR BLD AUTO: 48.1 % (ref 38.5–50)
HDLC SERPL-MCNC: 54 MG/DL
HGB BLD-MCNC: 15.5 G/DL (ref 13.2–17.1)
LDLC SERPL CALC-MCNC: 171 MG/DL (CALC)
LYMPHOCYTES # BLD AUTO: 999 CELLS/UL (ref 850–3900)
LYMPHOCYTES NFR BLD AUTO: 22.2 %
MCH RBC QN AUTO: 30.1 PG (ref 27–33)
MCHC RBC AUTO-ENTMCNC: 32.2 G/DL (ref 32–36)
MCV RBC AUTO: 93.4 FL (ref 80–100)
MONOCYTES # BLD AUTO: 468 CELLS/UL (ref 200–950)
MONOCYTES NFR BLD AUTO: 10.4 %
NEUTROPHILS # BLD AUTO: 2691 CELLS/UL (ref 1500–7800)
NEUTROPHILS NFR BLD AUTO: 59.8 %
NONHDLC SERPL-MCNC: 210 MG/DL (CALC)
PLATELET # BLD AUTO: 258 THOUSAND/UL (ref 140–400)
PMV BLD REES-ECKER: 9.7 FL (ref 7.5–12.5)
POTASSIUM SERPL-SCNC: 4.5 MMOL/L (ref 3.5–5.3)
PROT SERPL-MCNC: 6.8 G/DL (ref 6.1–8.1)
PSA SERPL-MCNC: 2.99 NG/ML
RBC # BLD AUTO: 5.15 MILLION/UL (ref 4.2–5.8)
SODIUM SERPL-SCNC: 139 MMOL/L (ref 135–146)
TRIGL SERPL-MCNC: 220 MG/DL
WBC # BLD AUTO: 4.5 THOUSAND/UL (ref 3.8–10.8)

## 2026-01-28 ENCOUNTER — APPOINTMENT (OUTPATIENT)
Dept: DERMATOLOGY | Facility: CLINIC | Age: 81
End: 2026-01-28
Payer: MEDICARE